# Patient Record
Sex: FEMALE
[De-identification: names, ages, dates, MRNs, and addresses within clinical notes are randomized per-mention and may not be internally consistent; named-entity substitution may affect disease eponyms.]

---

## 2017-06-28 ENCOUNTER — APPOINTMENT (OUTPATIENT)
Dept: FAMILY MEDICINE | Facility: CLINIC | Age: 22
End: 2017-06-28

## 2017-06-28 VITALS — HEART RATE: 68 BPM | DIASTOLIC BLOOD PRESSURE: 80 MMHG | SYSTOLIC BLOOD PRESSURE: 110 MMHG | RESPIRATION RATE: 16 BRPM

## 2017-06-28 VITALS
WEIGHT: 154.25 LBS | DIASTOLIC BLOOD PRESSURE: 80 MMHG | SYSTOLIC BLOOD PRESSURE: 120 MMHG | BODY MASS INDEX: 24.21 KG/M2 | HEIGHT: 67 IN

## 2017-06-29 LAB
ALBUMIN SERPL ELPH-MCNC: 4.5 G/DL
ALP BLD-CCNC: 58 U/L
ALT SERPL-CCNC: 13 U/L
ANION GAP SERPL CALC-SCNC: 17 MMOL/L
APPEARANCE: ABNORMAL
AST SERPL-CCNC: 22 U/L
BACTERIA: ABNORMAL
BASOPHILS # BLD AUTO: 0.07 K/UL
BASOPHILS NFR BLD AUTO: 0.8 %
BILIRUB SERPL-MCNC: 0.2 MG/DL
BILIRUBIN URINE: NEGATIVE
BLOOD URINE: NEGATIVE
BUN SERPL-MCNC: 12 MG/DL
CALCIUM SERPL-MCNC: 9.8 MG/DL
CHLORIDE SERPL-SCNC: 104 MMOL/L
CHOLEST SERPL-MCNC: 186 MG/DL
CHOLEST/HDLC SERPL: 3.3 RATIO
CO2 SERPL-SCNC: 20 MMOL/L
COLOR: YELLOW
CREAT SERPL-MCNC: 0.81 MG/DL
EOSINOPHIL # BLD AUTO: 0.19 K/UL
EOSINOPHIL NFR BLD AUTO: 2.2 %
GLUCOSE QUALITATIVE U: NORMAL MG/DL
GLUCOSE SERPL-MCNC: 82 MG/DL
HCT VFR BLD CALC: 42 %
HDLC SERPL-MCNC: 56 MG/DL
HGB BLD-MCNC: 13.7 G/DL
HYALINE CASTS: 5 /LPF
IMM GRANULOCYTES NFR BLD AUTO: 0.4 %
KETONES URINE: NEGATIVE
LDLC SERPL CALC-MCNC: 112 MG/DL
LEUKOCYTE ESTERASE URINE: NEGATIVE
LYMPHOCYTES # BLD AUTO: 3.25 K/UL
LYMPHOCYTES NFR BLD AUTO: 37.9 %
MAN DIFF?: NORMAL
MCHC RBC-ENTMCNC: 27.9 PG
MCHC RBC-ENTMCNC: 32.6 GM/DL
MCV RBC AUTO: 85.5 FL
MICROSCOPIC-UA: NORMAL
MONOCYTES # BLD AUTO: 0.86 K/UL
MONOCYTES NFR BLD AUTO: 10 %
NEUTROPHILS # BLD AUTO: 4.17 K/UL
NEUTROPHILS NFR BLD AUTO: 48.7 %
NITRITE URINE: NEGATIVE
PH URINE: 5.5
PLATELET # BLD AUTO: 336 K/UL
POTASSIUM SERPL-SCNC: 4.1 MMOL/L
PROT SERPL-MCNC: 7.7 G/DL
PROTEIN URINE: NEGATIVE MG/DL
RBC # BLD: 4.91 M/UL
RBC # FLD: 13.9 %
RED BLOOD CELLS URINE: 7 /HPF
SODIUM SERPL-SCNC: 141 MMOL/L
SPECIFIC GRAVITY URINE: 1.03
SQUAMOUS EPITHELIAL CELLS: 5 /HPF
T4 SERPL-MCNC: 8.1 UG/DL
TRIGL SERPL-MCNC: 92 MG/DL
TSH SERPL-ACNC: 5.41 UIU/ML
URATE SERPL-MCNC: 4.7 MG/DL
UROBILINOGEN URINE: NORMAL MG/DL
WBC # FLD AUTO: 8.57 K/UL
WHITE BLOOD CELLS URINE: 3 /HPF

## 2017-07-17 ENCOUNTER — APPOINTMENT (OUTPATIENT)
Dept: FAMILY MEDICINE | Facility: CLINIC | Age: 22
End: 2017-07-17

## 2017-07-19 ENCOUNTER — APPOINTMENT (OUTPATIENT)
Dept: FAMILY MEDICINE | Facility: CLINIC | Age: 22
End: 2017-07-19

## 2017-07-19 VITALS
DIASTOLIC BLOOD PRESSURE: 70 MMHG | BODY MASS INDEX: 24.37 KG/M2 | SYSTOLIC BLOOD PRESSURE: 100 MMHG | WEIGHT: 155.25 LBS | HEIGHT: 67 IN

## 2017-07-19 VITALS — DIASTOLIC BLOOD PRESSURE: 80 MMHG | SYSTOLIC BLOOD PRESSURE: 110 MMHG | HEART RATE: 72 BPM | RESPIRATION RATE: 16 BRPM

## 2017-07-31 ENCOUNTER — APPOINTMENT (OUTPATIENT)
Dept: FAMILY MEDICINE | Facility: CLINIC | Age: 22
End: 2017-07-31

## 2017-08-15 ENCOUNTER — APPOINTMENT (OUTPATIENT)
Dept: FAMILY MEDICINE | Facility: CLINIC | Age: 22
End: 2017-08-15

## 2017-10-25 ENCOUNTER — RESULT REVIEW (OUTPATIENT)
Age: 22
End: 2017-10-25

## 2017-12-20 ENCOUNTER — APPOINTMENT (OUTPATIENT)
Dept: FAMILY MEDICINE | Facility: CLINIC | Age: 22
End: 2017-12-20
Payer: MEDICAID

## 2017-12-20 VITALS
DIASTOLIC BLOOD PRESSURE: 70 MMHG | OXYGEN SATURATION: 98 % | HEIGHT: 67 IN | WEIGHT: 167.5 LBS | HEART RATE: 90 BPM | BODY MASS INDEX: 26.29 KG/M2 | SYSTOLIC BLOOD PRESSURE: 120 MMHG

## 2017-12-20 VITALS — SYSTOLIC BLOOD PRESSURE: 110 MMHG | DIASTOLIC BLOOD PRESSURE: 70 MMHG | HEART RATE: 72 BPM | RESPIRATION RATE: 16 BRPM

## 2017-12-20 PROCEDURE — 86580 TB INTRADERMAL TEST: CPT

## 2017-12-20 PROCEDURE — 99214 OFFICE O/P EST MOD 30 MIN: CPT | Mod: 25

## 2017-12-20 RX ORDER — SPIRONOLACTONE 50 MG/1
50 TABLET ORAL
Qty: 60 | Refills: 0 | Status: COMPLETED | COMMUNITY
Start: 2017-10-10

## 2017-12-20 RX ORDER — CLINDAMYCIN PHOSPHATE 1 G/10ML
1 GEL TOPICAL
Qty: 60 | Refills: 0 | Status: COMPLETED | COMMUNITY
Start: 2017-12-13

## 2017-12-20 RX ORDER — ESCITALOPRAM OXALATE 10 MG/1
10 TABLET ORAL
Qty: 30 | Refills: 0 | Status: COMPLETED | COMMUNITY
Start: 2017-06-28

## 2018-02-05 ENCOUNTER — RX RENEWAL (OUTPATIENT)
Age: 23
End: 2018-02-05

## 2018-08-13 ENCOUNTER — LABORATORY RESULT (OUTPATIENT)
Age: 23
End: 2018-08-13

## 2018-08-14 ENCOUNTER — APPOINTMENT (OUTPATIENT)
Dept: FAMILY MEDICINE | Facility: CLINIC | Age: 23
End: 2018-08-14
Payer: COMMERCIAL

## 2018-08-14 VITALS
RESPIRATION RATE: 14 BRPM | HEIGHT: 67 IN | SYSTOLIC BLOOD PRESSURE: 102 MMHG | BODY MASS INDEX: 27.37 KG/M2 | OXYGEN SATURATION: 99 % | HEART RATE: 62 BPM | DIASTOLIC BLOOD PRESSURE: 74 MMHG | WEIGHT: 174.38 LBS

## 2018-08-14 DIAGNOSIS — Z13.220 ENCOUNTER FOR SCREENING FOR LIPOID DISORDERS: ICD-10-CM

## 2018-08-14 DIAGNOSIS — R53.83 OTHER FATIGUE: ICD-10-CM

## 2018-08-14 PROCEDURE — 36415 COLL VENOUS BLD VENIPUNCTURE: CPT

## 2018-08-14 PROCEDURE — 90715 TDAP VACCINE 7 YRS/> IM: CPT

## 2018-08-14 PROCEDURE — G0444 DEPRESSION SCREEN ANNUAL: CPT

## 2018-08-14 PROCEDURE — 99395 PREV VISIT EST AGE 18-39: CPT | Mod: 25

## 2018-08-14 PROCEDURE — 90471 IMMUNIZATION ADMIN: CPT

## 2018-08-14 RX ORDER — SPIRONOLACTONE 25 MG/1
25 TABLET ORAL
Qty: 60 | Refills: 0 | Status: DISCONTINUED | COMMUNITY
Start: 2017-12-13 | End: 2018-08-14

## 2018-08-15 LAB
25(OH)D3 SERPL-MCNC: 17.6 NG/ML
ALBUMIN SERPL ELPH-MCNC: 4.4 G/DL
ALP BLD-CCNC: 50 U/L
ALT SERPL-CCNC: 18 U/L
ANION GAP SERPL CALC-SCNC: 14 MMOL/L
APPEARANCE: CLEAR
AST SERPL-CCNC: 23 U/L
B BURGDOR IGG+IGM SER QL IB: NORMAL
BACTERIA: ABNORMAL
BASOPHILS # BLD AUTO: 0.06 K/UL
BASOPHILS NFR BLD AUTO: 0.7 %
BILIRUB SERPL-MCNC: <0.2 MG/DL
BILIRUBIN URINE: NEGATIVE
BLOOD URINE: ABNORMAL
BUN SERPL-MCNC: 8 MG/DL
CALCIUM SERPL-MCNC: 9.5 MG/DL
CHLORIDE SERPL-SCNC: 102 MMOL/L
CHOLEST SERPL-MCNC: 198 MG/DL
CHOLEST/HDLC SERPL: 3.7 RATIO
CO2 SERPL-SCNC: 23 MMOL/L
COLOR: YELLOW
CREAT SERPL-MCNC: 0.74 MG/DL
EOSINOPHIL # BLD AUTO: 0.2 K/UL
EOSINOPHIL NFR BLD AUTO: 2.2 %
FOLATE SERPL-MCNC: >20 NG/ML
GLUCOSE QUALITATIVE U: NEGATIVE MG/DL
GLUCOSE SERPL-MCNC: 131 MG/DL
HBA1C MFR BLD HPLC: 5.5 %
HCT VFR BLD CALC: 38.5 %
HDLC SERPL-MCNC: 54 MG/DL
HGB BLD-MCNC: 12.3 G/DL
HYALINE CASTS: 3 /LPF
IMM GRANULOCYTES NFR BLD AUTO: 0.1 %
IRON SATN MFR SERPL: 20 %
IRON SERPL-MCNC: 71 UG/DL
KETONES URINE: NEGATIVE
LDLC SERPL CALC-MCNC: 103 MG/DL
LEUKOCYTE ESTERASE URINE: NEGATIVE
LYMPHOCYTES # BLD AUTO: 3.88 K/UL
LYMPHOCYTES NFR BLD AUTO: 43.2 %
MAN DIFF?: NORMAL
MCHC RBC-ENTMCNC: 27.4 PG
MCHC RBC-ENTMCNC: 31.9 GM/DL
MCV RBC AUTO: 85.7 FL
MICROSCOPIC-UA: NORMAL
MONOCYTES # BLD AUTO: 0.64 K/UL
MONOCYTES NFR BLD AUTO: 7.1 %
NEUTROPHILS # BLD AUTO: 4.19 K/UL
NEUTROPHILS NFR BLD AUTO: 46.7 %
NITRITE URINE: NEGATIVE
PH URINE: 6
PLATELET # BLD AUTO: 344 K/UL
POTASSIUM SERPL-SCNC: 3.8 MMOL/L
PROT SERPL-MCNC: 7.5 G/DL
PROTEIN URINE: NEGATIVE MG/DL
RBC # BLD: 4.49 M/UL
RBC # FLD: 14.2 %
RED BLOOD CELLS URINE: 6 /HPF
SODIUM SERPL-SCNC: 139 MMOL/L
SPECIFIC GRAVITY URINE: 1.02
SQUAMOUS EPITHELIAL CELLS: 7 /HPF
T4 FREE SERPL-MCNC: 1.2 NG/DL
THYROGLOB AB SERPL-ACNC: <20 IU/ML
THYROPEROXIDASE AB SERPL IA-ACNC: 13.9 IU/ML
TIBC SERPL-MCNC: 353 UG/DL
TRIGL SERPL-MCNC: 205 MG/DL
TSH SERPL-ACNC: 4.68 UIU/ML
UIBC SERPL-MCNC: 282 UG/DL
UROBILINOGEN URINE: NEGATIVE MG/DL
VIT B12 SERPL-MCNC: 615 PG/ML
WBC # FLD AUTO: 8.98 K/UL
WHITE BLOOD CELLS URINE: 6 /HPF

## 2018-08-24 ENCOUNTER — APPOINTMENT (OUTPATIENT)
Dept: FAMILY MEDICINE | Facility: CLINIC | Age: 23
End: 2018-08-24

## 2018-08-31 NOTE — HISTORY OF PRESENT ILLNESS
[FreeTextEntry1] : Annual physical  [de-identified] : \par 23 year old female presents for annual physical. \par \par She has anxiety/depression, on Lexapro\par Feels symptoms are controlled with the medication\par \par Feeling tired, fatigued, concerned about having low energy\par Tries to be active, will try to do Pilates in the morning, go for jogs\par \par Will be returning to [employer] in the fall, working on a double major in sociology and psychology \par \par Last GYN visit, May 2018\par Pap done Oct 2017 \par Currently on birth control \par \par

## 2018-08-31 NOTE — HEALTH RISK ASSESSMENT
[Patient reported PAP Smear was normal] : Patient reported PAP Smear was normal [1] : 2) Feeling down, depressed, or hopeless for several days (1) [] : No [de-identified] : social alcohol use [PapSmearDate] : 10/17

## 2018-08-31 NOTE — PHYSICAL EXAM
[No Acute Distress] : no acute distress [Well Nourished] : well nourished [Well Developed] : well developed [Well-Appearing] : well-appearing [Normal Sclera/Conjunctiva] : normal sclera/conjunctiva [PERRL] : pupils equal round and reactive to light [Normal Oropharynx] : the oropharynx was normal [Normal TMs] : both tympanic membranes were normal [Normal Appearance] : was normal in appearance [Neck Supple] : was supple [Normal] : the thyroid was normal [No Respiratory Distress] : no respiratory distress  [Clear to Auscultation] : lungs were clear to auscultation bilaterally [Normal Rhythm/Effort] : normal respiratory rhythm and effort [Normal Rate] : normal rate  [Regular Rhythm] : with a regular rhythm [Normal S1, S2] : normal S1 and S2 [No Murmur] : no murmur heard [No Carotid Bruits] : no carotid bruits [No Edema] : there was no peripheral edema [Soft] : abdomen soft [Non Tender] : non-tender [Non-distended] : non-distended [Normal Bowel Sounds] : normal bowel sounds [Normal Posterior Cervical Nodes] : no posterior cervical lymphadenopathy [Normal Anterior Cervical Nodes] : no anterior cervical lymphadenopathy [No Spinal Tenderness] : no spinal tenderness [Grossly Normal Strength/Tone] : grossly normal strength/tone [No Rash] : no rash [Normal Gait] : normal gait [No Focal Deficits] : no focal deficits [Alert and Oriented x3] : oriented to person, place, and time [Abnormal Color] : normal color and pigmentation [de-identified] : Breast exam deferred- done by GYN

## 2019-02-08 ENCOUNTER — APPOINTMENT (OUTPATIENT)
Dept: FAMILY MEDICINE | Facility: CLINIC | Age: 24
End: 2019-02-08
Payer: MEDICAID

## 2019-02-08 VITALS
TEMPERATURE: 98.1 F | HEART RATE: 75 BPM | BODY MASS INDEX: 27.47 KG/M2 | OXYGEN SATURATION: 98 % | HEIGHT: 67 IN | SYSTOLIC BLOOD PRESSURE: 104 MMHG | RESPIRATION RATE: 15 BRPM | DIASTOLIC BLOOD PRESSURE: 66 MMHG | WEIGHT: 175 LBS

## 2019-02-08 PROCEDURE — 99213 OFFICE O/P EST LOW 20 MIN: CPT

## 2019-02-08 NOTE — PHYSICAL EXAM
[No Acute Distress] : no acute distress [Well Nourished] : well nourished [Well Developed] : well developed [Well-Appearing] : well-appearing [Normal Appearance] : was normal in appearance [Neck Supple] : was supple [No Respiratory Distress] : no respiratory distress  [Clear to Auscultation] : lungs were clear to auscultation bilaterally [Normal Rate] : normal rate  [Regular Rhythm] : with a regular rhythm [Normal S1, S2] : normal S1 and S2 [No Murmur] : no murmur heard [No Edema] : there was no peripheral edema [Soft] : abdomen soft [Non Tender] : non-tender [Normal Posterior Cervical Nodes] : no posterior cervical lymphadenopathy [Normal Anterior Cervical Nodes] : no anterior cervical lymphadenopathy [No Spinal Tenderness] : no spinal tenderness [No Rash] : no rash [No Focal Deficits] : no focal deficits [Alert and Oriented x3] : oriented to person, place, and time [Normal Rhythm/Effort] : normal respiratory rhythm and effort [de-identified] : tenderness to left posterior shoulder, worse with flexion/extension; no tenderness to right shoulder; tenderness to anterior chest, sternal region

## 2019-02-08 NOTE — ASSESSMENT
[FreeTextEntry1] : pain likely muscular in etiology\par over the counter Ibuprofen when needed\par apply heat to affected areas\par trial of Cyclobenzaprine, take as directed, advised on drowsy side effects, not to drive while on medication\par if symptoms worsen or don't improve, call or return to office
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
## 2019-04-09 NOTE — ASSESSMENT
[FreeTextEntry1] : Health care maintenance:\par check blood work \par follow up with optometry/ophthalmology and dentist for routine exams\par up to date with GYN\par c/w diet and exercise as tolerated\par Tdap given, patient tolerated well\par \par Anxiety/Depression:\par c/w Lexapro\par see in house mental health counselor \par \par Hypothyroidism:\par check blood work\par see endocrinologist \par \par Fatigue:\par check blood work\par see endocrinologist  Principal Discharge DX:	Hypomagnesemia  Secondary Diagnosis:	Lower extremity edema  Secondary Diagnosis:	Leg pain, bilateral

## 2019-09-07 ENCOUNTER — RX RENEWAL (OUTPATIENT)
Age: 24
End: 2019-09-07

## 2019-10-04 ENCOUNTER — RX RENEWAL (OUTPATIENT)
Age: 24
End: 2019-10-04

## 2019-10-16 ENCOUNTER — RX RENEWAL (OUTPATIENT)
Age: 24
End: 2019-10-16

## 2019-10-30 ENCOUNTER — APPOINTMENT (OUTPATIENT)
Dept: OBGYN | Facility: CLINIC | Age: 24
End: 2019-10-30
Payer: MEDICAID

## 2019-10-30 ENCOUNTER — RECORD ABSTRACTING (OUTPATIENT)
Age: 24
End: 2019-10-30

## 2019-10-30 VITALS
SYSTOLIC BLOOD PRESSURE: 120 MMHG | WEIGHT: 174 LBS | HEIGHT: 67 IN | DIASTOLIC BLOOD PRESSURE: 70 MMHG | BODY MASS INDEX: 27.31 KG/M2

## 2019-10-30 DIAGNOSIS — L70.9 ACNE, UNSPECIFIED: ICD-10-CM

## 2019-10-30 DIAGNOSIS — Z12.4 ENCOUNTER FOR SCREENING FOR MALIGNANT NEOPLASM OF CERVIX: ICD-10-CM

## 2019-10-30 DIAGNOSIS — Z82.49 FAMILY HISTORY OF ISCHEMIC HEART DISEASE AND OTHER DISEASES OF THE CIRCULATORY SYSTEM: ICD-10-CM

## 2019-10-30 DIAGNOSIS — Z87.42 PERSONAL HISTORY OF OTHER DISEASES OF THE FEMALE GENITAL TRACT: ICD-10-CM

## 2019-10-30 DIAGNOSIS — Z11.3 ENCOUNTER FOR SCREENING FOR INFECTIONS WITH A PREDOMINANTLY SEXUAL MODE OF TRANSMISSION: ICD-10-CM

## 2019-10-30 DIAGNOSIS — Z30.9 ENCOUNTER FOR CONTRACEPTIVE MANAGEMENT, UNSPECIFIED: ICD-10-CM

## 2019-10-30 DIAGNOSIS — Z78.9 OTHER SPECIFIED HEALTH STATUS: ICD-10-CM

## 2019-10-30 DIAGNOSIS — Z92.89 PERSONAL HISTORY OF OTHER MEDICAL TREATMENT: ICD-10-CM

## 2019-10-30 LAB — CYTOLOGY CVX/VAG DOC THIN PREP: NORMAL

## 2019-10-30 PROCEDURE — 99395 PREV VISIT EST AGE 18-39: CPT

## 2019-10-30 RX ORDER — CYCLOBENZAPRINE HYDROCHLORIDE 10 MG/1
10 TABLET, FILM COATED ORAL
Qty: 1 | Refills: 0 | Status: DISCONTINUED | COMMUNITY
Start: 2019-02-08 | End: 2019-10-30

## 2019-10-30 NOTE — HISTORY OF PRESENT ILLNESS
[Last Pap ___] : Last cervical pap smear was [unfilled] [Reproductive Age] : is of reproductive age [Definite:  ___ (Date)] : the last menstrual period was [unfilled] [Menarche Age: ____] : age at menarche was [unfilled] [Yes] : yes [Contraception] : does not use contraception [Pregnancy History] : denies prior pregnancies [Sexually Active] : is not sexually active

## 2019-10-30 NOTE — PHYSICAL EXAM
[Awake] : awake [Alert] : alert [Soft] : soft [Labia Majora] : labia major [Labia Minora] : labia minora [Normal] : clitoris [Acute Distress] : no acute distress [LAD] : no lymphadenopathy [Thyroid Nodule] : no thyroid nodule [Goiter] : no goiter [Mass] : no breast mass [Nipple Discharge] : no nipple discharge [Axillary LAD] : no axillary lymphadenopathy [Tender] : non tender [FreeTextEntry4] : pap deerred - pt unable to tolerate speculum

## 2020-03-12 ENCOUNTER — RX RENEWAL (OUTPATIENT)
Age: 25
End: 2020-03-12

## 2020-04-17 ENCOUNTER — RX RENEWAL (OUTPATIENT)
Age: 25
End: 2020-04-17

## 2020-04-22 ENCOUNTER — APPOINTMENT (OUTPATIENT)
Dept: FAMILY MEDICINE | Facility: CLINIC | Age: 25
End: 2020-04-22
Payer: MEDICAID

## 2020-04-22 VITALS
OXYGEN SATURATION: 98 % | WEIGHT: 166 LBS | DIASTOLIC BLOOD PRESSURE: 80 MMHG | RESPIRATION RATE: 16 BRPM | SYSTOLIC BLOOD PRESSURE: 110 MMHG | HEIGHT: 67 IN | HEART RATE: 77 BPM | BODY MASS INDEX: 26.06 KG/M2

## 2020-04-22 VITALS — SYSTOLIC BLOOD PRESSURE: 110 MMHG | DIASTOLIC BLOOD PRESSURE: 80 MMHG | RESPIRATION RATE: 16 BRPM | HEART RATE: 72 BPM

## 2020-04-22 PROCEDURE — 99213 OFFICE O/P EST LOW 20 MIN: CPT

## 2020-04-22 NOTE — HEALTH RISK ASSESSMENT
[Yes] : Yes [Monthly or less (1 pt)] : Monthly or less (1 point) [0] : 2) Feeling down, depressed, or hopeless: Not at all (0) [] : No [de-identified] : regular

## 2020-04-22 NOTE — PHYSICAL EXAM
[No Acute Distress] : no acute distress [PERRL] : pupils equal round and reactive to light [No Lymphadenopathy] : no lymphadenopathy [Normal Oropharynx] : the oropharynx was normal [Regular Rhythm] : with a regular rhythm [Clear to Auscultation] : lungs were clear to auscultation bilaterally [No Edema] : there was no peripheral edema [Normal Posterior Cervical Nodes] : no posterior cervical lymphadenopathy [Normal Supraclavicular Nodes] : no supraclavicular lymphadenopathy [Normal Anterior Cervical Nodes] : no anterior cervical lymphadenopathy [Normal] : no CVA or spinal tenderness [No Joint Swelling] : no joint swelling [No Rash] : no rash [No Focal Deficits] : no focal deficits [Normal Insight/Judgement] : insight and judgment were intact

## 2020-04-22 NOTE — HISTORY OF PRESENT ILLNESS
[FreeTextEntry1] : pt  here  for  med renewal  [de-identified] : feeling well taking  all meds mood  good  anxiety well controlled taking lexapro  for  5  yrs  does  not  want to come off

## 2020-04-22 NOTE — REVIEW OF SYSTEMS
[Dyspnea on Exertion] : dyspnea on exertion [Chest Pain] : no chest pain [Constipation] : no constipation [Diarrhea] : diarrhea [Dysuria] : no dysuria [Dysmenorrhea] : no dysmenorrhea

## 2020-04-23 LAB
ALBUMIN SERPL ELPH-MCNC: 4.6 G/DL
ALP BLD-CCNC: 54 U/L
ALT SERPL-CCNC: 15 U/L
ANION GAP SERPL CALC-SCNC: 13 MMOL/L
AST SERPL-CCNC: 19 U/L
BASOPHILS # BLD AUTO: 0.11 K/UL
BASOPHILS NFR BLD AUTO: 1.3 %
BILIRUB SERPL-MCNC: 0.3 MG/DL
BUN SERPL-MCNC: 10 MG/DL
CALCIUM SERPL-MCNC: 9.8 MG/DL
CHLORIDE SERPL-SCNC: 102 MMOL/L
CHOLEST SERPL-MCNC: 210 MG/DL
CHOLEST/HDLC SERPL: 3.5 RATIO
CO2 SERPL-SCNC: 25 MMOL/L
CREAT SERPL-MCNC: 0.65 MG/DL
EOSINOPHIL # BLD AUTO: 0.17 K/UL
EOSINOPHIL NFR BLD AUTO: 2 %
GLUCOSE SERPL-MCNC: 123 MG/DL
HCT VFR BLD CALC: 40 %
HDLC SERPL-MCNC: 59 MG/DL
HGB BLD-MCNC: 13 G/DL
IMM GRANULOCYTES NFR BLD AUTO: 0.2 %
LDLC SERPL CALC-MCNC: 108 MG/DL
LYMPHOCYTES # BLD AUTO: 3.55 K/UL
LYMPHOCYTES NFR BLD AUTO: 41.9 %
MAN DIFF?: NORMAL
MCHC RBC-ENTMCNC: 27.9 PG
MCHC RBC-ENTMCNC: 32.5 GM/DL
MCV RBC AUTO: 85.8 FL
MONOCYTES # BLD AUTO: 0.52 K/UL
MONOCYTES NFR BLD AUTO: 6.1 %
NEUTROPHILS # BLD AUTO: 4.1 K/UL
NEUTROPHILS NFR BLD AUTO: 48.5 %
PLATELET # BLD AUTO: 360 K/UL
POTASSIUM SERPL-SCNC: 4.8 MMOL/L
PROT SERPL-MCNC: 7.3 G/DL
RBC # BLD: 4.66 M/UL
RBC # FLD: 13.3 %
SODIUM SERPL-SCNC: 140 MMOL/L
T4 SERPL-MCNC: 9.3 UG/DL
TRIGL SERPL-MCNC: 210 MG/DL
TSH SERPL-ACNC: 2.64 UIU/ML
URATE SERPL-MCNC: 4.2 MG/DL
WBC # FLD AUTO: 8.47 K/UL

## 2020-12-15 ENCOUNTER — APPOINTMENT (OUTPATIENT)
Dept: FAMILY MEDICINE | Facility: CLINIC | Age: 25
End: 2020-12-15
Payer: MEDICARE

## 2020-12-15 VITALS
BODY MASS INDEX: 25.9 KG/M2 | TEMPERATURE: 97.3 F | WEIGHT: 165 LBS | HEART RATE: 73 BPM | DIASTOLIC BLOOD PRESSURE: 72 MMHG | RESPIRATION RATE: 16 BRPM | OXYGEN SATURATION: 98 % | SYSTOLIC BLOOD PRESSURE: 110 MMHG | HEIGHT: 67 IN

## 2020-12-15 PROCEDURE — 99395 PREV VISIT EST AGE 18-39: CPT

## 2020-12-15 PROCEDURE — 99072 ADDL SUPL MATRL&STAF TM PHE: CPT

## 2020-12-18 NOTE — HISTORY OF PRESENT ILLNESS
[FreeTextEntry1] : Annual physical  [de-identified] : \par 25 year old female presents for annual physical. \par \par She has anxiety/depression, on Lexapro\par Feels symptoms are controlled with the medication\par Requests medication refill \par \par Trying to lose weight\par has made changes to her diet and eating habits\par has been exercising- jogs 3 days a week (30-45 minutes)\par reports having PCOS\par \par Plans to go to graduate school in the fall of 2021 to study public health\par currently works as a pharmacy tech at [employer]\par \par Due to follow up with GYN\par on birth control\par \par up to date with Flu vaccine

## 2020-12-18 NOTE — ASSESSMENT
[FreeTextEntry1] : Health care maintenance:\par check blood work\par follow up with optometry/ophthalmology and dentist for routine exams when due\par follow up with GYN\par c/w diet and exercise \par up to date with Flu vaccine\par \par Depression/Anxiety:\par stable\par c/w Lexapro\par \par PCOS:\par check blood work\par f/u with GYN\par consider endocrine evaluation

## 2020-12-18 NOTE — HEALTH RISK ASSESSMENT
[Yes] : Yes [Monthly or less (1 pt)] : Monthly or less (1 point) [1 or 2 (0 pts)] : 1 or 2 (0 points) [Never (0 pts)] : Never (0 points) [No] : In the past 12 months have you used drugs other than those required for medical reasons? No [] : No [de-identified] : occasional alcohol use [Audit-CScore] : 1

## 2021-01-06 LAB
25(OH)D3 SERPL-MCNC: 16.2 NG/ML
ALBUMIN SERPL ELPH-MCNC: 4.4 G/DL
ALP BLD-CCNC: 53 U/L
ALT SERPL-CCNC: 16 U/L
ANION GAP SERPL CALC-SCNC: 8 MMOL/L
APPEARANCE: ABNORMAL
AST SERPL-CCNC: 18 U/L
BACTERIA: ABNORMAL
BASOPHILS # BLD AUTO: 0.11 K/UL
BASOPHILS NFR BLD AUTO: 1.1 %
BILIRUB SERPL-MCNC: 0.3 MG/DL
BILIRUBIN URINE: NEGATIVE
BLOOD URINE: NORMAL
BUN SERPL-MCNC: 12 MG/DL
CALCIUM SERPL-MCNC: 9.4 MG/DL
CHLORIDE SERPL-SCNC: 103 MMOL/L
CHOLEST SERPL-MCNC: 186 MG/DL
CO2 SERPL-SCNC: 27 MMOL/L
COLOR: YELLOW
CREAT SERPL-MCNC: 0.79 MG/DL
EOSINOPHIL # BLD AUTO: 0.13 K/UL
EOSINOPHIL NFR BLD AUTO: 1.3 %
ESTIMATED AVERAGE GLUCOSE: 105 MG/DL
FERRITIN SERPL-MCNC: 86 NG/ML
FOLATE SERPL-MCNC: 7.4 NG/ML
FSH SERPL-MCNC: 8.9 IU/L
GLUCOSE QUALITATIVE U: NEGATIVE
GLUCOSE SERPL-MCNC: 98 MG/DL
HBA1C MFR BLD HPLC: 5.3 %
HCT VFR BLD CALC: 41 %
HDLC SERPL-MCNC: 57 MG/DL
HGB BLD-MCNC: 13 G/DL
HYALINE CASTS: 3 /LPF
IMM GRANULOCYTES NFR BLD AUTO: 0.2 %
INSULIN P FAST SERPL-ACNC: 16.2 UU/ML
IRON SATN MFR SERPL: 31 %
IRON SERPL-MCNC: 99 UG/DL
KETONES URINE: NEGATIVE
LDLC SERPL CALC-MCNC: 104 MG/DL
LEUKOCYTE ESTERASE URINE: NEGATIVE
LH SERPL-ACNC: 17.5 IU/L
LYMPHOCYTES # BLD AUTO: 4.25 K/UL
LYMPHOCYTES NFR BLD AUTO: 41.4 %
MAN DIFF?: NORMAL
MCHC RBC-ENTMCNC: 27.9 PG
MCHC RBC-ENTMCNC: 31.7 GM/DL
MCV RBC AUTO: 88 FL
MICROSCOPIC-UA: NORMAL
MONOCYTES # BLD AUTO: 1.07 K/UL
MONOCYTES NFR BLD AUTO: 10.4 %
NEUTROPHILS # BLD AUTO: 4.68 K/UL
NEUTROPHILS NFR BLD AUTO: 45.6 %
NITRITE URINE: NEGATIVE
NONHDLC SERPL-MCNC: 129 MG/DL
PH URINE: 6
PLATELET # BLD AUTO: 337 K/UL
POTASSIUM SERPL-SCNC: 4.6 MMOL/L
PROLACTIN SERPL-MCNC: 28.1 NG/ML
PROT SERPL-MCNC: 7.1 G/DL
PROTEIN URINE: NORMAL
RBC # BLD: 4.66 M/UL
RBC # FLD: 13.2 %
RED BLOOD CELLS URINE: 7 /HPF
SODIUM SERPL-SCNC: 137 MMOL/L
SPECIFIC GRAVITY URINE: 1.03
SQUAMOUS EPITHELIAL CELLS: 3 /HPF
T3FREE SERPL-MCNC: 2.64 PG/ML
T4 FREE SERPL-MCNC: 1.1 NG/DL
TESTOST BND SERPL-MCNC: 6.4 PG/ML
TESTOST SERPL-MCNC: 55.6 NG/DL
THYROGLOB AB SERPL-ACNC: 22.4 IU/ML
THYROPEROXIDASE AB SERPL IA-ACNC: 11.9 IU/ML
TIBC SERPL-MCNC: 319 UG/DL
TRIGL SERPL-MCNC: 125 MG/DL
TSH SERPL-ACNC: 3.38 UIU/ML
UIBC SERPL-MCNC: 220 UG/DL
UROBILINOGEN URINE: NORMAL
VIT B12 SERPL-MCNC: 467 PG/ML
WBC # FLD AUTO: 10.26 K/UL
WHITE BLOOD CELLS URINE: 2 /HPF

## 2021-01-25 ENCOUNTER — APPOINTMENT (OUTPATIENT)
Dept: FAMILY MEDICINE | Facility: CLINIC | Age: 26
End: 2021-01-25

## 2021-04-28 ENCOUNTER — APPOINTMENT (OUTPATIENT)
Dept: ENDOCRINOLOGY | Facility: CLINIC | Age: 26
End: 2021-04-28
Payer: MEDICARE

## 2021-04-28 VITALS
DIASTOLIC BLOOD PRESSURE: 70 MMHG | WEIGHT: 168 LBS | HEART RATE: 71 BPM | BODY MASS INDEX: 26.37 KG/M2 | HEIGHT: 67 IN | SYSTOLIC BLOOD PRESSURE: 110 MMHG

## 2021-04-28 DIAGNOSIS — E78.5 HYPERLIPIDEMIA, UNSPECIFIED: ICD-10-CM

## 2021-04-28 DIAGNOSIS — N92.6 IRREGULAR MENSTRUATION, UNSPECIFIED: ICD-10-CM

## 2021-04-28 DIAGNOSIS — Z86.39 PERSONAL HISTORY OF OTHER ENDOCRINE, NUTRITIONAL AND METABOLIC DISEASE: ICD-10-CM

## 2021-04-28 PROCEDURE — 99204 OFFICE O/P NEW MOD 45 MIN: CPT

## 2021-04-28 PROCEDURE — 99072 ADDL SUPL MATRL&STAF TM PHE: CPT

## 2021-04-28 NOTE — HISTORY OF PRESENT ILLNESS
[FreeTextEntry1] : PCOS Dx 2019 by Gyn and started OCP at that time\par \par Presents today for elevated testo levels and inability to lose weight.\par \par Weight stable but losing weight despite efforts\par Lifestyle gym 2-3 days at the gym, cardio/weight training 60 min.  Diet: low carb, small changes, more veggies\par Hair on chin and below chin and on abdomen - started at 19 yrs old. No hair loss.\par Denies acne, improved with OCP\par Menses: Menarche 10 yrs old, always irregular and no menses on OCP, following w Gyn.\par No plans for pregnancy at this time. \par \par

## 2021-04-28 NOTE — CONSULT LETTER
[Dear  ___] : Dear  [unfilled], [Consult Letter:] : I had the pleasure of evaluating your patient, [unfilled]. [Please see my note below.] : Please see my note below. [Consult Closing:] : Thank you very much for allowing me to participate in the care of this patient.  If you have any questions, please do not hesitate to contact me. [Sincerely,] : Sincerely, [FreeTextEntry3] : Tarah Welsh, \par

## 2021-04-28 NOTE — REVIEW OF SYSTEMS
[As Noted in HPI] : as noted in HPI [Headaches] : headaches [Polydipsia] : polydipsia [Negative] : Musculoskeletal [Galactorrhea] : no galactorrhea  [FreeTextEntry6] : (+) snoring, wakes up tired [FreeTextEntry7] : (+) indigestion [de-identified] : denies breast pain

## 2021-04-28 NOTE — ASSESSMENT
[FreeTextEntry1] : 25 year old female with PCOS w irregular menses, mild hirsutism and hyperlipidemia, Overweight w BMI 27 and asymptomatic elevated prolactin levels\par - repeat Prolactin, also check macroprolactin\par - cont OCP \par - cont mechanical method of hair removal\par - counseled pt on weight loss w liefstyle changes - reviewed diet programs ie weight watchers, Noom, intermittent fasting, myfitnesspal to help w dietary changes. cont exercise\par - check testo, 17OHP, DHEAs\par - check repeat fasting lipid\par - screen for DM w fasting sugar, A1c and OGTT\par - will consider MFN therapy\par \par 45 min spent w pt- reviewing labs, discussion of PCOS and weight loss management

## 2021-04-28 NOTE — PHYSICAL EXAM
[Alert] : alert [Well Nourished] : well nourished [Healthy Appearance] : healthy appearance [No Acute Distress] : no acute distress [EOMI] : extra ocular movement intact [No LAD] : no lymphadenopathy [Thyroid Not Enlarged] : the thyroid was not enlarged [No Thyroid Nodules] : no palpable thyroid nodules [No Accessory Muscle Use] : no accessory muscle use [Clear to Auscultation] : lungs were clear to auscultation bilaterally [Normal S1, S2] : normal S1 and S2 [Normal Rate] : heart rate was normal [No Edema] : no peripheral edema [Normal Appearance] : normal in appearance [No Masses] : no palpable masses [No Nipple Discharge] : no nipple discharge [No Galactorrhea] : no galactorrhea [Normal Bowel Sounds] : normal bowel sounds [Not Tender] : non-tender [Soft] : abdomen soft [No Stigmata of Cushings Syndrome] : no stigmata of Cushings Syndrome [Normal Gait] : normal gait [Hirsutism] : hirsutism present [Cranial Nerves Intact] : cranial nerves 2-12 were intact [Oriented x3] : oriented to person, place, and time [Normal Affect] : the affect was normal [Normal Insight/Judgement] : insight and judgment were intact [Normal Mood] : the mood was normal [Abdominal Striae] : no abdominal striae [Acanthosis Nigricans] : no acanthosis nigricans [Acne] : no acne

## 2021-05-05 ENCOUNTER — LABORATORY RESULT (OUTPATIENT)
Age: 26
End: 2021-05-05

## 2021-05-26 ENCOUNTER — NON-APPOINTMENT (OUTPATIENT)
Age: 26
End: 2021-05-26

## 2021-08-03 ENCOUNTER — APPOINTMENT (OUTPATIENT)
Dept: ENDOCRINOLOGY | Facility: CLINIC | Age: 26
End: 2021-08-03

## 2021-09-18 ENCOUNTER — RX RENEWAL (OUTPATIENT)
Age: 26
End: 2021-09-18

## 2021-09-20 ENCOUNTER — TRANSCRIPTION ENCOUNTER (OUTPATIENT)
Age: 26
End: 2021-09-20

## 2021-09-28 ENCOUNTER — TRANSCRIPTION ENCOUNTER (OUTPATIENT)
Age: 26
End: 2021-09-28

## 2021-10-15 ENCOUNTER — TRANSCRIPTION ENCOUNTER (OUTPATIENT)
Age: 26
End: 2021-10-15

## 2021-11-14 ENCOUNTER — TRANSCRIPTION ENCOUNTER (OUTPATIENT)
Age: 26
End: 2021-11-14

## 2021-12-21 ENCOUNTER — RX RENEWAL (OUTPATIENT)
Age: 26
End: 2021-12-21

## 2022-05-15 ENCOUNTER — RX RENEWAL (OUTPATIENT)
Age: 27
End: 2022-05-15

## 2022-05-25 ENCOUNTER — RX RENEWAL (OUTPATIENT)
Age: 27
End: 2022-05-25

## 2022-07-05 ENCOUNTER — NON-APPOINTMENT (OUTPATIENT)
Age: 27
End: 2022-07-05

## 2022-09-10 ENCOUNTER — APPOINTMENT (OUTPATIENT)
Dept: FAMILY MEDICINE | Facility: CLINIC | Age: 27
End: 2022-09-10

## 2022-10-03 ENCOUNTER — RX RENEWAL (OUTPATIENT)
Age: 27
End: 2022-10-03

## 2022-10-16 ENCOUNTER — NON-APPOINTMENT (OUTPATIENT)
Age: 27
End: 2022-10-16

## 2023-04-10 ENCOUNTER — RX RENEWAL (OUTPATIENT)
Age: 28
End: 2023-04-10

## 2023-04-14 ENCOUNTER — APPOINTMENT (OUTPATIENT)
Dept: FAMILY MEDICINE | Facility: CLINIC | Age: 28
End: 2023-04-14
Payer: COMMERCIAL

## 2023-04-14 VITALS
TEMPERATURE: 97.1 F | HEIGHT: 67 IN | SYSTOLIC BLOOD PRESSURE: 118 MMHG | OXYGEN SATURATION: 97 % | WEIGHT: 185 LBS | BODY MASS INDEX: 29.03 KG/M2 | DIASTOLIC BLOOD PRESSURE: 72 MMHG | HEART RATE: 93 BPM

## 2023-04-14 PROCEDURE — 96127 BRIEF EMOTIONAL/BEHAV ASSMT: CPT

## 2023-04-14 PROCEDURE — 99213 OFFICE O/P EST LOW 20 MIN: CPT | Mod: 25

## 2023-04-14 NOTE — HEALTH RISK ASSESSMENT
[1] : 2) Feeling down, depressed, or hopeless for several days (1) [PHQ-2 Positive] : PHQ-2 Positive [PHQ-9 Positive] : PHQ-9 Positive [I have developed a follow-up plan documented below in the note.] : I have developed a follow-up plan documented below in the note. [[id number]] : 2

## 2023-04-14 NOTE — HISTORY OF PRESENT ILLNESS
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

## 2023-04-14 NOTE — ASSESSMENT
[FreeTextEntry1] : will look to lower dose of Lexapro- recommend she take 15 mg daily x 2 weeks, then decrease down to 10 mg daily\par she is to monitor her mood- call back if her mood changes\par advised to schedule and return for an annual physical- will check blood work at that time

## 2023-04-14 NOTE — PHYSICAL EXAM
[No Acute Distress] : no acute distress [Well Nourished] : well nourished [Well Developed] : well developed [Well-Appearing] : well-appearing [Normal Appearance] : was normal in appearance [Neck Supple] : was supple [No Respiratory Distress] : no respiratory distress  [Clear to Auscultation] : lungs were clear to auscultation bilaterally [Normal Rate] : normal rate  [Regular Rhythm] : with a regular rhythm [Normal S1, S2] : normal S1 and S2 [No Murmur] : no murmur heard [Alert and Oriented x3] : oriented to person, place, and time [Normal Mood] : the mood was normal [Normal Insight/Judgement] : insight and judgment were intact

## 2023-07-19 ENCOUNTER — APPOINTMENT (OUTPATIENT)
Dept: FAMILY MEDICINE | Facility: CLINIC | Age: 28
End: 2023-07-19
Payer: COMMERCIAL

## 2023-07-19 VITALS
SYSTOLIC BLOOD PRESSURE: 106 MMHG | RESPIRATION RATE: 16 BRPM | DIASTOLIC BLOOD PRESSURE: 78 MMHG | HEART RATE: 77 BPM | WEIGHT: 186.06 LBS | TEMPERATURE: 97.5 F | BODY MASS INDEX: 29.2 KG/M2 | HEIGHT: 67 IN | OXYGEN SATURATION: 98 %

## 2023-07-19 DIAGNOSIS — E22.1 HYPERPROLACTINEMIA: ICD-10-CM

## 2023-07-19 DIAGNOSIS — Z23 ENCOUNTER FOR IMMUNIZATION: ICD-10-CM

## 2023-07-19 DIAGNOSIS — F32.A DEPRESSION, UNSPECIFIED: ICD-10-CM

## 2023-07-19 DIAGNOSIS — Z00.00 ENCOUNTER FOR GENERAL ADULT MEDICAL EXAMINATION W/OUT ABNORMAL FINDINGS: ICD-10-CM

## 2023-07-19 DIAGNOSIS — F41.9 ANXIETY DISORDER, UNSPECIFIED: ICD-10-CM

## 2023-07-19 DIAGNOSIS — R07.81 PLEURODYNIA: ICD-10-CM

## 2023-07-19 DIAGNOSIS — M25.512 PAIN IN LEFT SHOULDER: ICD-10-CM

## 2023-07-19 PROCEDURE — 99395 PREV VISIT EST AGE 18-39: CPT | Mod: 25

## 2023-07-19 PROCEDURE — 99213 OFFICE O/P EST LOW 20 MIN: CPT | Mod: 25

## 2023-07-19 RX ORDER — ESCITALOPRAM OXALATE 10 MG/1
10 TABLET ORAL
Qty: 90 | Refills: 1 | Status: ACTIVE | COMMUNITY
Start: 2023-04-14 | End: 1900-01-01

## 2023-07-19 RX ORDER — NORETHINDRONE ACETATE AND ETHINYL ESTRADIOL AND FERROUS FUMARATE 1MG-20(21)
1-20 KIT ORAL DAILY
Qty: 3 | Refills: 3 | Status: DISCONTINUED | COMMUNITY
Start: 2019-10-30 | End: 2023-07-19

## 2023-07-19 RX ORDER — ESCITALOPRAM OXALATE 20 MG/1
20 TABLET ORAL
Qty: 30 | Refills: 3 | Status: DISCONTINUED | COMMUNITY
Start: 2017-06-28 | End: 2023-07-19

## 2023-07-19 NOTE — HISTORY OF PRESENT ILLNESS
[FreeTextEntry1] : Annual physical  [de-identified] : \par 28 year old female presents for an annual physical.\par \par Has anxiety/depression\par since her last office visit- Lexapro dose was reduced to 15 mg daily \par she reports her mood is stable- she would like to continue to lower her dose\par has not noticed an improvement with her weight (had concerns medication had caused weight gain)\par \par graduated in May 2023 with her masters in public health\par interviewing for jobs \par currently works as  for [employer] [place]\par \par was seen by Endocrine in April 2021\par did not go for MRI of pituitary gland- h/o elevated prolactin\par has PCOS- off of birth control

## 2023-07-19 NOTE — ASSESSMENT
[FreeTextEntry1] : Health care maintenance:\par check blood work\par follow up with optometry/ophthalmology and dentist for routine exams when due\par follow up with GYN \par \par Hyperprolactinemia:\par check updated prolactin level\par referred for MRI of pituitary gland\par reassess after results are available- may recommend f/u with endocrinology \par \par PCOS:\par check blood work\par consider f/u with endocrinology\par \par Overweight:\par c/w diet and exercise\par check blood work\par \par Anxiety/Depression:\par c/w Lexapro, will reduce dose to 10 mg daily\par monitor \par

## 2023-07-19 NOTE — HEALTH RISK ASSESSMENT
[Yes] : Yes [Monthly or less (1 pt)] : Monthly or less (1 point) [1 or 2 (0 pts)] : 1 or 2 (0 points) [Never (0 pts)] : Never (0 points) [No] : In the past 12 months have you used drugs other than those required for medical reasons? No [Patient reported PAP Smear was normal] : Patient reported PAP Smear was normal [Never] : Never [de-identified] : rare alcohol use [Audit-CScore] : 1 [de-identified] : Exercises regularly- 5 days a week, goes to the gym, Isidroates  [de-identified] : Diet is generally good, has reduced sugar intake, hydrates with water  [PapSmearDate] : 10/2017

## 2023-07-31 ENCOUNTER — OUTPATIENT (OUTPATIENT)
Dept: OUTPATIENT SERVICES | Facility: HOSPITAL | Age: 28
LOS: 1 days | End: 2023-07-31
Payer: COMMERCIAL

## 2023-07-31 ENCOUNTER — APPOINTMENT (OUTPATIENT)
Dept: MRI IMAGING | Facility: CLINIC | Age: 28
End: 2023-07-31
Payer: COMMERCIAL

## 2023-07-31 DIAGNOSIS — E22.1 HYPERPROLACTINEMIA: ICD-10-CM

## 2023-07-31 PROCEDURE — 70553 MRI BRAIN STEM W/O & W/DYE: CPT | Mod: 26

## 2023-07-31 PROCEDURE — A9585: CPT

## 2023-07-31 PROCEDURE — 70553 MRI BRAIN STEM W/O & W/DYE: CPT

## 2023-08-15 LAB
25(OH)D3 SERPL-MCNC: 14.7 NG/ML
ALBUMIN SERPL ELPH-MCNC: 4.4 G/DL
ALP BLD-CCNC: 63 U/L
ALT SERPL-CCNC: 20 U/L
ANION GAP SERPL CALC-SCNC: 12 MMOL/L
APPEARANCE: ABNORMAL
AST SERPL-CCNC: 17 U/L
BACTERIA: ABNORMAL /HPF
BASOPHILS # BLD AUTO: 0.08 K/UL
BASOPHILS NFR BLD AUTO: 1.1 %
BILIRUB SERPL-MCNC: 0.2 MG/DL
BILIRUBIN URINE: NEGATIVE
BLOOD URINE: ABNORMAL
BUN SERPL-MCNC: 11 MG/DL
CALCIUM SERPL-MCNC: 9.4 MG/DL
CAST: 5 /LPF
CHLORIDE SERPL-SCNC: 102 MMOL/L
CHOLEST SERPL-MCNC: 202 MG/DL
CO2 SERPL-SCNC: 20 MMOL/L
COARSE GRANULAR CASTS: PRESENT
COLOR: YELLOW
CREAT SERPL-MCNC: 0.73 MG/DL
EGFR: 113 ML/MIN/1.73M2
EOSINOPHIL # BLD AUTO: 0.13 K/UL
EOSINOPHIL NFR BLD AUTO: 1.7 %
EPITHELIAL CELLS: 19 /HPF
ESTIMATED AVERAGE GLUCOSE: 111 MG/DL
ESTRADIOL SERPL-MCNC: 48 PG/ML
FERRITIN SERPL-MCNC: 62 NG/ML
FOLATE SERPL-MCNC: 7.4 NG/ML
FSH SERPL-MCNC: 5.2 IU/L
GLUCOSE QUALITATIVE U: NEGATIVE MG/DL
GLUCOSE SERPL-MCNC: 96 MG/DL
HBA1C MFR BLD HPLC: 5.5 %
HCT VFR BLD CALC: 38 %
HDLC SERPL-MCNC: 51 MG/DL
HGB BLD-MCNC: 12.5 G/DL
IMM GRANULOCYTES NFR BLD AUTO: 0.1 %
INSULIN P FAST SERPL-ACNC: 19.3 UU/ML
IRON SATN MFR SERPL: 28 %
IRON SERPL-MCNC: 79 UG/DL
KETONES URINE: NEGATIVE MG/DL
LDLC SERPL CALC-MCNC: 128 MG/DL
LEUKOCYTE ESTERASE URINE: ABNORMAL
LH SERPL-ACNC: 4 IU/L
LYMPHOCYTES # BLD AUTO: 3.66 K/UL
LYMPHOCYTES NFR BLD AUTO: 48.5 %
MAN DIFF?: NORMAL
MCHC RBC-ENTMCNC: 27.8 PG
MCHC RBC-ENTMCNC: 32.9 GM/DL
MCV RBC AUTO: 84.4 FL
MICROSCOPIC-UA: NORMAL
MONOCYTES # BLD AUTO: 0.66 K/UL
MONOCYTES NFR BLD AUTO: 8.7 %
NEUTROPHILS # BLD AUTO: 3.01 K/UL
NEUTROPHILS NFR BLD AUTO: 39.9 %
NITRITE URINE: NEGATIVE
NONHDLC SERPL-MCNC: 151 MG/DL
PH URINE: 5.5
PLATELET # BLD AUTO: 339 K/UL
POTASSIUM SERPL-SCNC: 3.9 MMOL/L
PROLACTIN SERPL-MCNC: 21.1 NG/ML
PROT SERPL-MCNC: 7.1 G/DL
PROTEIN URINE: NORMAL MG/DL
RBC # BLD: 4.5 M/UL
RBC # FLD: 13.5 %
RED BLOOD CELLS URINE: 10 /HPF
REVIEW: NORMAL
SODIUM SERPL-SCNC: 133 MMOL/L
SPECIFIC GRAVITY URINE: 1.03
T4 FREE SERPL-MCNC: 1.2 NG/DL
TESTOST FREE SERPL-MCNC: 2.7 PG/ML
TESTOST SERPL-MCNC: 37.9 NG/DL
TIBC SERPL-MCNC: 280 UG/DL
TRIGL SERPL-MCNC: 129 MG/DL
TSH SERPL-ACNC: 4.18 UIU/ML
UIBC SERPL-MCNC: 201 UG/DL
UROBILINOGEN URINE: 0.2 MG/DL
VIT B12 SERPL-MCNC: 463 PG/ML
WBC # FLD AUTO: 7.55 K/UL
WHITE BLOOD CELLS URINE: 15 /HPF

## 2024-01-02 ENCOUNTER — APPOINTMENT (OUTPATIENT)
Dept: ENDOCRINOLOGY | Facility: CLINIC | Age: 29
End: 2024-01-02
Payer: COMMERCIAL

## 2024-01-02 VITALS
HEIGHT: 67 IN | WEIGHT: 181 LBS | BODY MASS INDEX: 28.41 KG/M2 | HEART RATE: 67 BPM | SYSTOLIC BLOOD PRESSURE: 120 MMHG | OXYGEN SATURATION: 99 % | DIASTOLIC BLOOD PRESSURE: 86 MMHG

## 2024-01-02 DIAGNOSIS — L68.0 HIRSUTISM: ICD-10-CM

## 2024-01-02 DIAGNOSIS — E28.2 POLYCYSTIC OVARIAN SYNDROME: ICD-10-CM

## 2024-01-02 DIAGNOSIS — E66.3 OVERWEIGHT: ICD-10-CM

## 2024-01-02 DIAGNOSIS — D35.2 BENIGN NEOPLASM OF PITUITARY GLAND: ICD-10-CM

## 2024-01-02 PROCEDURE — 99214 OFFICE O/P EST MOD 30 MIN: CPT

## 2024-01-02 RX ORDER — DEXAMETHASONE 1 MG/1
1 TABLET ORAL
Qty: 1 | Refills: 0 | Status: ACTIVE | COMMUNITY
Start: 2024-01-02 | End: 1900-01-01

## 2024-01-02 RX ORDER — DEXTROAMPHETAMINE SACCHARATE, AMPHETAMINE ASPARTATE, DEXTROAMPHETAMINE SULFATE, AND AMPHETAMINE SULFATE 2.5; 2.5; 2.5; 2.5 MG/1; MG/1; MG/1; MG/1
10 TABLET ORAL
Refills: 0 | Status: ACTIVE | COMMUNITY

## 2024-01-02 NOTE — HISTORY OF PRESENT ILLNESS
[FreeTextEntry1] : Interval hx:  - having hard time losing weight, has been dieting and exercising, and wanted to know if  there is a hormonal cause for weight issues - H/o elevated prolactin level and pituitary MRI 7/2023 with 1-2 mm area of decreased enhancement right side of pituitary gland but prolactin level was normal at the time  Lifestyle - gym 3x/week gyn weight/cardio. Pilates 5x/week. Diet - eats 3 meals, no snacking. protein/carbs/grains

## 2024-01-02 NOTE — REVIEW OF SYSTEMS
[As Noted in HPI] : as noted in HPI [Nausea] : nausea [Anxiety] : anxiety [Fatigue] : no fatigue [Blurred Vision] : no blurred vision [Chest Pain] : no chest pain [Shortness Of Breath] : no shortness of breath [Constipation] : no constipation [Abdominal Pain] : no abdominal pain [Diarrhea] : no diarrhea [Polyuria] : no polyuria [Nocturia] : no nocturia [Irregular Menses] : regular menses [Joint Pain] : no joint pain [Myalgia] : no myalgia  [Acne] : no acne [Hair Loss] : no hair loss [Tremors] : no tremors [Depression] : no depression [Polydipsia] : no polydipsia [Cold Intolerance] : no cold intolerance [Heat Intolerance] : no heat intolerance [Hot Flashes] : no hot flashes [Galactorrhea] : no galactorrhea  [FreeTextEntry2] : appetite increased w exercise [FreeTextEntry8] : has been off OCP June 2023, menses regular and monthly after starting supplement (vitex - berry supplement) [de-identified] : occ chin hairs treated with laser [de-identified] : (+) vertigo domingo in am when getting up from bed, posterior neck pains [de-identified] : denies breast pains

## 2024-01-02 NOTE — PHYSICAL EXAM
[Alert] : alert [No Acute Distress] : no acute distress [EOMI] : extra ocular movement intact [No Respiratory Distress] : no respiratory distress [No Accessory Muscle Use] : no accessory muscle use [Clear to Auscultation] : lungs were clear to auscultation bilaterally [Normal S1, S2] : normal S1 and S2 [Normal Rate] : heart rate was normal [Not Tender] : non-tender [Soft] : abdomen soft [No Stigmata of Cushings Syndrome] : no stigmata of Cushings Syndrome [Normal Gait] : normal gait [Hirsutism] : hirsutism present [Cranial Nerves Intact] : cranial nerves 2-12 were intact [Normal Reflexes] : deep tendon reflexes were 2+ and symmetric [No Tremors] : no tremors [Oriented x3] : oriented to person, place, and time [Normal Affect] : the affect was normal [Normal Insight/Judgement] : insight and judgment were intact [Normal Mood] : the mood was normal [Abdominal Striae] : no abdominal striae [Acanthosis Nigricans] : no acanthosis nigricans [Acne] : no acne

## 2024-01-02 NOTE — ASSESSMENT
[FreeTextEntry1] : 28 yr old female 1. PCOS - overweight with family history of T2DM (mom) and HLD on labs 7/2023 - menses now regular with weight loss and starting OTC supplement - hirsutism is stable and improved with laser tx - check testo, DHEAs and 17 OHP - cont lifestyle changes - check fasting lipid - check A1c and OGTT  2. 1-2 mm area of decreased enhancement right side of pituitary gland - h/o prolactin elevation - not likely to be clinically significant, labs 7/023 with normal thyroid axis, normal gonadal axis and normla prolactin level, repeat testing also check IGf1 and adrenal axis  3. overweight, concerned about inability to lose weight - check 1 mg DST  4. vit d def - not on supplement, check repeat labs

## 2024-10-10 ENCOUNTER — RX RENEWAL (OUTPATIENT)
Age: 29
End: 2024-10-10

## 2024-10-14 ENCOUNTER — APPOINTMENT (OUTPATIENT)
Dept: FAMILY MEDICINE | Facility: CLINIC | Age: 29
End: 2024-10-14
Payer: COMMERCIAL

## 2024-10-14 VITALS
HEART RATE: 61 BPM | OXYGEN SATURATION: 99 % | WEIGHT: 175.04 LBS | HEIGHT: 67 IN | DIASTOLIC BLOOD PRESSURE: 68 MMHG | SYSTOLIC BLOOD PRESSURE: 116 MMHG | TEMPERATURE: 97.3 F | BODY MASS INDEX: 27.47 KG/M2

## 2024-10-14 DIAGNOSIS — Z23 ENCOUNTER FOR IMMUNIZATION: ICD-10-CM

## 2024-10-14 DIAGNOSIS — F32.A DEPRESSION, UNSPECIFIED: ICD-10-CM

## 2024-10-14 DIAGNOSIS — F41.9 ANXIETY DISORDER, UNSPECIFIED: ICD-10-CM

## 2024-10-14 PROCEDURE — G0008: CPT

## 2024-10-14 PROCEDURE — 90656 IIV3 VACC NO PRSV 0.5 ML IM: CPT

## 2024-10-14 PROCEDURE — 99213 OFFICE O/P EST LOW 20 MIN: CPT | Mod: 25

## 2025-01-14 ENCOUNTER — NON-APPOINTMENT (OUTPATIENT)
Age: 30
End: 2025-01-14

## 2025-01-14 ENCOUNTER — APPOINTMENT (OUTPATIENT)
Dept: FAMILY MEDICINE | Facility: CLINIC | Age: 30
End: 2025-01-14
Payer: COMMERCIAL

## 2025-01-14 VITALS
DIASTOLIC BLOOD PRESSURE: 74 MMHG | TEMPERATURE: 97.3 F | WEIGHT: 172 LBS | HEART RATE: 73 BPM | BODY MASS INDEX: 27 KG/M2 | OXYGEN SATURATION: 99 % | SYSTOLIC BLOOD PRESSURE: 120 MMHG | HEIGHT: 67 IN

## 2025-01-14 DIAGNOSIS — Z00.00 ENCOUNTER FOR GENERAL ADULT MEDICAL EXAMINATION W/OUT ABNORMAL FINDINGS: ICD-10-CM

## 2025-01-14 DIAGNOSIS — F41.9 ANXIETY DISORDER, UNSPECIFIED: ICD-10-CM

## 2025-01-14 DIAGNOSIS — E78.5 HYPERLIPIDEMIA, UNSPECIFIED: ICD-10-CM

## 2025-01-14 DIAGNOSIS — F32.A DEPRESSION, UNSPECIFIED: ICD-10-CM

## 2025-01-14 DIAGNOSIS — E22.1 HYPERPROLACTINEMIA: ICD-10-CM

## 2025-01-14 DIAGNOSIS — D35.2 BENIGN NEOPLASM OF PITUITARY GLAND: ICD-10-CM

## 2025-01-14 DIAGNOSIS — E66.3 OVERWEIGHT: ICD-10-CM

## 2025-01-14 DIAGNOSIS — E28.2 POLYCYSTIC OVARIAN SYNDROME: ICD-10-CM

## 2025-01-14 PROCEDURE — 99213 OFFICE O/P EST LOW 20 MIN: CPT | Mod: 25

## 2025-01-14 PROCEDURE — 99395 PREV VISIT EST AGE 18-39: CPT

## 2025-01-14 RX ORDER — METFORMIN HYDROCHLORIDE 500 MG/1
500 TABLET, COATED ORAL
Qty: 360 | Refills: 0 | Status: ACTIVE | COMMUNITY
Start: 2025-01-14

## 2025-01-14 RX ORDER — ESCITALOPRAM OXALATE 5 MG/1
5 TABLET ORAL DAILY
Qty: 90 | Refills: 1 | Status: ACTIVE | COMMUNITY
Start: 2025-01-14 | End: 1900-01-01

## 2025-01-17 DIAGNOSIS — D72.820 LYMPHOCYTOSIS (SYMPTOMATIC): ICD-10-CM

## 2025-01-17 DIAGNOSIS — E55.9 VITAMIN D DEFICIENCY, UNSPECIFIED: ICD-10-CM

## 2025-01-17 LAB
25(OH)D3 SERPL-MCNC: 16 NG/ML
ALBUMIN SERPL ELPH-MCNC: 4.4 G/DL
ALP BLD-CCNC: 67 U/L
ALT SERPL-CCNC: 13 U/L
ANION GAP SERPL CALC-SCNC: 12 MMOL/L
AST SERPL-CCNC: 12 U/L
BASOPHILS # BLD AUTO: 0.08 K/UL
BASOPHILS NFR BLD AUTO: 1.1 %
BILIRUB SERPL-MCNC: 0.3 MG/DL
BUN SERPL-MCNC: 12 MG/DL
CALCIUM SERPL-MCNC: 9.6 MG/DL
CHLORIDE SERPL-SCNC: 103 MMOL/L
CHOLEST SERPL-MCNC: 219 MG/DL
CO2 SERPL-SCNC: 24 MMOL/L
CREAT SERPL-MCNC: 0.69 MG/DL
EGFR: 119 ML/MIN/1.73M2
EOSINOPHIL # BLD AUTO: 0.11 K/UL
EOSINOPHIL NFR BLD AUTO: 1.5 %
ESTIMATED AVERAGE GLUCOSE: 111 MG/DL
ESTRADIOL SERPL-MCNC: 41 PG/ML
FERRITIN SERPL-MCNC: 70 NG/ML
FOLATE SERPL-MCNC: 5.5 NG/ML
FSH SERPL-MCNC: 9.2 IU/L
GLUCOSE SERPL-MCNC: 94 MG/DL
HBA1C MFR BLD HPLC: 5.5 %
HCT VFR BLD CALC: 41.2 %
HDLC SERPL-MCNC: 55 MG/DL
HGB BLD-MCNC: 13.4 G/DL
IMM GRANULOCYTES NFR BLD AUTO: 0.1 %
INSULIN P FAST SERPL-ACNC: 20.3 UU/ML
IRON SATN MFR SERPL: 32 %
IRON SERPL-MCNC: 94 UG/DL
LDLC SERPL CALC-MCNC: 135 MG/DL
LH SERPL-ACNC: 8.3 IU/L
LYMPHOCYTES # BLD AUTO: 3.72 K/UL
LYMPHOCYTES NFR BLD AUTO: 51.3 %
MAN DIFF?: NORMAL
MCHC RBC-ENTMCNC: 28.2 PG
MCHC RBC-ENTMCNC: 32.5 G/DL
MCV RBC AUTO: 86.6 FL
MONOCYTES # BLD AUTO: 0.5 K/UL
MONOCYTES NFR BLD AUTO: 6.9 %
NEUTROPHILS # BLD AUTO: 2.83 K/UL
NEUTROPHILS NFR BLD AUTO: 39.1 %
NONHDLC SERPL-MCNC: 163 MG/DL
PLATELET # BLD AUTO: 351 K/UL
POTASSIUM SERPL-SCNC: 4.5 MMOL/L
PROLACTIN SERPL-MCNC: 11.9 NG/ML
PROT SERPL-MCNC: 7.5 G/DL
RBC # BLD: 4.76 M/UL
RBC # FLD: 13.5 %
SODIUM SERPL-SCNC: 139 MMOL/L
T4 FREE SERPL-MCNC: 1.2 NG/DL
TESTOST FREE SERPL-MCNC: 1.4 PG/ML
TESTOST SERPL-MCNC: 41 NG/DL
TIBC SERPL-MCNC: 297 UG/DL
TRIGL SERPL-MCNC: 162 MG/DL
TSH SERPL-ACNC: 3.49 UIU/ML
UIBC SERPL-MCNC: 204 UG/DL
VIT B12 SERPL-MCNC: 456 PG/ML
WBC # FLD AUTO: 7.25 K/UL

## 2025-01-17 RX ORDER — ERGOCALCIFEROL 1.25 MG/1
1.25 MG CAPSULE ORAL
Qty: 12 | Refills: 0 | Status: ACTIVE | COMMUNITY
Start: 2025-01-17 | End: 1900-01-01

## 2025-08-22 ENCOUNTER — RX RENEWAL (OUTPATIENT)
Age: 30
End: 2025-08-22

## 2025-09-02 ENCOUNTER — APPOINTMENT (OUTPATIENT)
Dept: FAMILY MEDICINE | Facility: CLINIC | Age: 30
End: 2025-09-02